# Patient Record
(demographics unavailable — no encounter records)

---

## 2025-02-21 NOTE — REASON FOR VISIT
[FreeTextEntry1] : 50 y/o F with PMH of Hypertension, Asthma  Diagnosed with Hypertension, a few years ago and was recently on Amlodipine and notes swelling and then was stopped; swelling was ongoing for a year and referred to stop the Amlodipine  Metoprolol 50mg po q 12hrs   Exerts herself and feels winded and going up one set of stairs; alleviated with taking the bronchodilators. With the shortness of breath she feels chest pain; still has swelling towards the end of the day  Palpitations with dizziness and lightheadedness, was feeling sick  No orthopnea; Wheezing at night more - advised to take the Symbicort twice a day   Smoker: Non smoker spouse is smoker  Alcohol: socailly  Family Hx: mild heart attack  cousin- CHF  Brothers and sisters no heart condition

## 2025-03-28 NOTE — CARDIOLOGY SUMMARY
[de-identified] : 1. Resting baseline LV ejection fraction was was estimated at approximately 55 to 60% (normal EF). Immediate post-stress the LV ejection fraction is was estimated at approximately 75 to 80% (hyperdynamic). 2. Arrythmias: occasional PVCs and PACs during infusion and recovery. 3. Normal global systolic function at rest. normal left ventricular global systolic function immediate post-stress. 4. Chest pain prior to test: No chest pain. 5. Developed grade 10 angina (on a scale of 1 to 10) located in the jaw and substernal chest starting at 14 min 50 sec of stress at a heart rate of 133 bpm with a blood pressure of 107/69 mmHg which did not resolve. Pain was treated with Lopressor, SL nitro and baby aspirin. 6. Did not achieve target heart rate. 7. Resting ECG revealed sinus rhythm with nonspecific ST-T wave changes. 8. Pt transferred to [facility] ED via ambulance for further evaluation.  [de-identified] :  1. Left atrium is normal in size.  2. Mild left ventricular hypertrophy.  3. Left ventricular cavity is normal in size. Left ventricular systolic function is normal with an ejection fraction of 66 % by Xavier's method of disks.  4. Normal left ventricular diastolic function.  5. The right atrium is normal in size.  6. Normal right ventricular cavity size and normal right ventricular systolic function.  7. No significant valvular disease.  8. No pericardial effusion seen.  [de-identified] : LHC: No evidence of CAD

## 2025-03-28 NOTE — REASON FOR VISIT
[Symptom and Test Evaluation] : symptom and test evaluation [FreeTextEntry1] : 50 y/o F with PMH of Hypertension, Asthma presents for cardiovascular evaluation and complaints of exertional shortness of breath and follow-up posthospitalization for symptomatic during stress testing patient presented on  Patient presented on 3/5/2025 for dobutamine stress echocardiogram during testing noted to be very symptomatic with chest pain chest heaviness and shortness of breath during that time due to symptoms not resolving was referred to the hospital for an cardiac catheterization. Cardiac catheterization showed no evidence of CAD or evidence of dissection.

## 2025-03-28 NOTE — ASSESSMENT
[FreeTextEntry1] : 48 y/o F with PMH of Hypertension, Asthma presents for cardiovascular evaluation and complaints of exertional shortness of breath and follow-up posthospitalization for symptomatic during stress testing patient presented on